# Patient Record
Sex: FEMALE
[De-identification: names, ages, dates, MRNs, and addresses within clinical notes are randomized per-mention and may not be internally consistent; named-entity substitution may affect disease eponyms.]

---
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
## 2021-02-01 ENCOUNTER — HOSPITAL ENCOUNTER (EMERGENCY)
Facility: HOSPITAL | Age: 21
Discharge: HOME | End: 2021-02-01
Attending: EMERGENCY MEDICINE
Payer: COMMERCIAL

## 2021-02-01 VITALS
OXYGEN SATURATION: 98 % | BODY MASS INDEX: 26.88 KG/M2 | HEART RATE: 82 BPM | TEMPERATURE: 99 F | DIASTOLIC BLOOD PRESSURE: 86 MMHG | WEIGHT: 192 LBS | HEIGHT: 71 IN | RESPIRATION RATE: 16 BRPM | SYSTOLIC BLOOD PRESSURE: 142 MMHG

## 2021-02-01 DIAGNOSIS — T54.91XA: Primary | ICD-10-CM

## 2021-02-01 PROCEDURE — 99283 EMERGENCY DEPT VISIT LOW MDM: CPT

## 2021-02-01 RX ORDER — LORAZEPAM 1 MG/1
1 TABLET ORAL ONCE
Status: COMPLETED | OUTPATIENT
Start: 2021-02-01 | End: 2021-02-01

## 2021-02-01 NOTE — ED INITIAL ASSESSMENT (HPI)
PT reports worsening cough after cleaning with mixture of bleach/water/g[place] platinum in small bathroom this am.  Has had SOB since incident.

## 2021-02-01 NOTE — ED PROVIDER NOTES
Patient Seen in: [facility] Emergency Department    History   Patient presents with:  Exposure,Chem Occupational      HPI    Patient presents to the ED complaining of coughing and the feeling like her lungs are irritated ever since cleaning a bath Exam   Constitutional: She is oriented to person, place, and time. She appears well-developed and well-nourished. No distress. HENT:   Head: Normocephalic and atraumatic. Eyes: Conjunctivae are normal. Right eye exhibits no discharge.  Left eye exhibits this morning. Suspect mild lung irritation. Patient with worsened symptoms will try to sleep tonight likely due to anxiety. Patient requesting anxiety medication in the ED and felt improved following. Stable for discharge with supportive care.     Addit